# Patient Record
Sex: MALE | Race: WHITE | NOT HISPANIC OR LATINO | Employment: FULL TIME | URBAN - NONMETROPOLITAN AREA
[De-identification: names, ages, dates, MRNs, and addresses within clinical notes are randomized per-mention and may not be internally consistent; named-entity substitution may affect disease eponyms.]

---

## 2021-09-15 RX ORDER — OMEPRAZOLE 40 MG/1
40 CAPSULE, DELAYED RELEASE ORAL DAILY
COMMUNITY

## 2021-09-15 RX ORDER — CETIRIZINE HYDROCHLORIDE 10 MG/1
10 TABLET ORAL DAILY
COMMUNITY

## 2021-09-15 RX ORDER — ATORVASTATIN CALCIUM 10 MG/1
20 TABLET, FILM COATED ORAL DAILY
COMMUNITY

## 2021-09-15 RX ORDER — LISINOPRIL 10 MG/1
10 TABLET ORAL DAILY
COMMUNITY

## 2021-09-15 NOTE — PRE-PROCEDURE INSTRUCTIONS
Pre-Surgery Instructions:   Medication Instructions    atorvastatin (LIPITOR) 10 mg tablet Instructed patient per Anesthesia Guidelines   cetirizine (ZyrTEC) 10 mg tablet Instructed patient per Anesthesia Guidelines   lisinopril (ZESTRIL) 10 mg tablet Instructed patient per Anesthesia Guidelines   omeprazole (PriLOSEC) 40 MG capsule Instructed patient per Anesthesia Guidelines       Pt verbalizes understanding of the following:  - NPO after MN  - Hold Lisinopril DOP, can take other meds with a sip of water  - Hold cigarette smoking x24hrs prior   - Bathing instructions, has chg  - Avoid OTC Vit/ Herbals/ Suppl x7 days   - Avoid NSAIDs x3 days

## 2021-09-18 ENCOUNTER — APPOINTMENT (OUTPATIENT)
Dept: LAB | Facility: HOSPITAL | Age: 56
End: 2021-09-18
Attending: STUDENT IN AN ORGANIZED HEALTH CARE EDUCATION/TRAINING PROGRAM
Payer: COMMERCIAL

## 2021-09-18 DIAGNOSIS — Z01.818 PRE-OP TESTING: ICD-10-CM

## 2021-09-18 LAB
ANION GAP SERPL CALCULATED.3IONS-SCNC: 7 MMOL/L (ref 4–13)
BUN SERPL-MCNC: 14 MG/DL (ref 5–25)
CALCIUM SERPL-MCNC: 9 MG/DL (ref 8.3–10.1)
CHLORIDE SERPL-SCNC: 102 MMOL/L (ref 100–108)
CO2 SERPL-SCNC: 28 MMOL/L (ref 21–32)
CREAT SERPL-MCNC: 0.9 MG/DL (ref 0.6–1.3)
ERYTHROCYTE [DISTWIDTH] IN BLOOD BY AUTOMATED COUNT: 12.6 % (ref 11.6–15.1)
GFR SERPL CREATININE-BSD FRML MDRD: 94 ML/MIN/1.73SQ M
GLUCOSE SERPL-MCNC: 103 MG/DL (ref 65–140)
HCT VFR BLD AUTO: 47.3 % (ref 36.5–49.3)
HGB BLD-MCNC: 16.2 G/DL (ref 12–17)
MCH RBC QN AUTO: 31.6 PG (ref 26.8–34.3)
MCHC RBC AUTO-ENTMCNC: 34.2 G/DL (ref 31.4–37.4)
MCV RBC AUTO: 92 FL (ref 82–98)
PLATELET # BLD AUTO: 344 THOUSANDS/UL (ref 149–390)
PMV BLD AUTO: 10.1 FL (ref 8.9–12.7)
POTASSIUM SERPL-SCNC: 4.5 MMOL/L (ref 3.5–5.3)
RBC # BLD AUTO: 5.13 MILLION/UL (ref 3.88–5.62)
SODIUM SERPL-SCNC: 137 MMOL/L (ref 136–145)
WBC # BLD AUTO: 9.27 THOUSAND/UL (ref 4.31–10.16)

## 2021-09-18 PROCEDURE — 36415 COLL VENOUS BLD VENIPUNCTURE: CPT

## 2021-09-18 PROCEDURE — 80048 BASIC METABOLIC PNL TOTAL CA: CPT

## 2021-09-18 PROCEDURE — 93005 ELECTROCARDIOGRAM TRACING: CPT

## 2021-09-18 PROCEDURE — 85027 COMPLETE CBC AUTOMATED: CPT

## 2021-09-19 LAB
ATRIAL RATE: 83 BPM
P AXIS: 82 DEGREES
PR INTERVAL: 138 MS
QRS AXIS: 44 DEGREES
QRSD INTERVAL: 86 MS
QT INTERVAL: 366 MS
QTC INTERVAL: 430 MS
T WAVE AXIS: 48 DEGREES
VENTRICULAR RATE: 83 BPM

## 2021-09-19 PROCEDURE — 93010 ELECTROCARDIOGRAM REPORT: CPT | Performed by: INTERNAL MEDICINE

## 2021-09-23 ENCOUNTER — ANESTHESIA EVENT (OUTPATIENT)
Dept: PERIOP | Facility: HOSPITAL | Age: 56
End: 2021-09-23
Payer: COMMERCIAL

## 2021-09-23 NOTE — H&P
[facility] [place]     Date: 21        H&P     Referring Provider: BLAS Matute        CC: lump     HPI:  This is a 47-year-old male who presents due to a lump located in the right groin crease  This lump has been present for approximately 1 month  Initially, it was smaller in size  The lump becomes larger when he is on his feet for long time  At times there is a burning pain present  The lump usually goes away with laying down  He denies any nausea or vomiting  He does not have any diarrhea or constipation    He does have some difficulty emptying his bladder since the hernia has been present          History:       Past Medical History:   Diagnosis Date    Chronic lower back pain      Depression with anxiety 2015    Dyslipidemia, goal LDL below 100 2015    Dyslipidemia, goal LDL below 130 2016    MINISTERIO (generalized anxiety disorder) 2015    Gastroesophageal reflux disease without esophagitis 3/2/2018    Tobacco use disorder 2016               Past Surgical History:   Procedure Laterality Date    CARPAL TUNNEL SURGERY Right      COLONOSCOPY        DENTAL SURGERY PROCEDURE NEC        PARTIAL REMOVAL OF COLON         as child due to constipation    WA ARTHROSCOPY SHOULDER ROTATOR CUFF REPAIR Right                 Family History   Problem Relation Age of Onset    Cancer Father            age 59 lung CA    Endocrine Disorder Mother           high cholesterol    Thyroid Disorder Mother      No Past Hx Brother      No Known Problems Grandmother (Maternal)      No Known Problems Grandfather (Maternal)      No Known Problems Grandmother (Paternal)      No Known Problems Grandfather (Paternal)           Social History            Tobacco Use    Smoking status: Current Every Day Smoker       Packs/day: 0 50       Years: 30 00       Pack years: 15 00       Types: Cigarettes    Smokeless tobacco: Never Used   Substance Use Topics    Alcohol use: Yes       Comment: occasionally    Drug use: No              Review of patient's allergies indicates: Allergen Reactions    Seasonal  [Pollen]                  Current Outpatient Medications   Medication Sig Dispense Refill    Atorvastatin Calcium 20 MG Oral Tablet (Lipitor) Take 1 Tab by mouth daily  90 Tab 3    Cetirizine HCl 10 MG Oral Tablet (ZyrTEC Allergy) Take  by mouth         Cyclobenzaprine HCl 10 MG Oral Tablet (Flexeril) Take 1 Tab by mouth at bedtime as needed for Muscle spasms  20 Tab 0    Lisinopril 20 MG Oral Tablet (Prinivil) Take 1 Tab by mouth daily  90 Tab 3    Omeprazole 40 MG Oral Capsule Delayed Release (PriLOSEC) TAKE 1 CAPSULE BY MOUTH ONCE DAILY AS DIRECTED  90 Cap 3    Mometasone Furoate (NASONEX) 50 MCG/ACT nasal spray Administer 2 Sprays into each nostril daily  1 Inhaler 5         ROS:  Constitutional:  Denies weakness and fatigue  ENT:  Denies congestion  Resp:  Denies shortness of breath  Cardiac:  Denies chest pain  GI:  Denies abdominal pain, nausea, vomiting  Musculoskeletal:  Denies muscle ache  Neuro:  Denies headache  Heme:  Denies history of bleeding or blood clots  Endo:  Denies fever  Skin:  Denies skin changes        PE:  Vital Signs:BP (!) 155/102   Pulse 85   Temp 98 2 °F (36 8 °C) (Oral)   Resp 20   Ht 5' 11" (1 803 m)   Wt 90 7 kg (200 lb)   SpO2 98%   BMI 27 89 kg/m²     Gen:  No acute distress, appears comfortable  HEENT:  Normocephalic, atraumatic  Lungs:  Clear auscultation bilateral  Heart:  Regular rate and rhythm, no murmur  Abdomen:  Soft, nontender, nondistended  Groin:  Positive for a reducible right inguinal hernia, no evidence of left inguinal hernia  Ext:  No edema  Skin:  Warm, dry, intact  Neuro:  Awake, alert oriented x3     Labs:  None available     Radiology:  None available     Assessment:  52-year-old male with a right inguinal hernia      Plan:   Recommend robotic assisted right inguinal hernia repair mesh    The procedure was discussed in detail with the patient  Understood the risks including spermatic cord injury, bleeding, infection  All questions were answered to the patient's satisfaction    The patient signed consent

## 2021-09-24 ENCOUNTER — HOSPITAL ENCOUNTER (OUTPATIENT)
Facility: HOSPITAL | Age: 56
Setting detail: OUTPATIENT SURGERY
Discharge: HOME | End: 2021-09-24
Attending: STUDENT IN AN ORGANIZED HEALTH CARE EDUCATION/TRAINING PROGRAM | Admitting: STUDENT IN AN ORGANIZED HEALTH CARE EDUCATION/TRAINING PROGRAM
Payer: COMMERCIAL

## 2021-09-24 ENCOUNTER — ANESTHESIA (OUTPATIENT)
Dept: PERIOP | Facility: HOSPITAL | Age: 56
End: 2021-09-24
Payer: COMMERCIAL

## 2021-09-24 VITALS
RESPIRATION RATE: 20 BRPM | SYSTOLIC BLOOD PRESSURE: 159 MMHG | BODY MASS INDEX: 28 KG/M2 | OXYGEN SATURATION: 96 % | TEMPERATURE: 97.6 F | DIASTOLIC BLOOD PRESSURE: 89 MMHG | HEART RATE: 76 BPM | HEIGHT: 71 IN | WEIGHT: 200 LBS

## 2021-09-24 DIAGNOSIS — K40.90 NON-RECURRENT UNILATERAL INGUINAL HERNIA WITHOUT OBSTRUCTION OR GANGRENE: Primary | ICD-10-CM

## 2021-09-24 PROCEDURE — C1781 MESH (IMPLANTABLE): HCPCS | Performed by: STUDENT IN AN ORGANIZED HEALTH CARE EDUCATION/TRAINING PROGRAM

## 2021-09-24 DEVICE — 3DMAX™ MID ANATOMICAL MESH, LARGE, RIGHT, 4” X 6”, 10 X 16 CM
Type: IMPLANTABLE DEVICE | Site: INGUINAL | Status: FUNCTIONAL
Brand: 3DMAX™ MID ANATOMICAL MESH

## 2021-09-24 RX ORDER — LIDOCAINE HYDROCHLORIDE 10 MG/ML
INJECTION, SOLUTION EPIDURAL; INFILTRATION; INTRACAUDAL; PERINEURAL AS NEEDED
Status: DISCONTINUED | OUTPATIENT
Start: 2021-09-24 | End: 2021-09-24

## 2021-09-24 RX ORDER — ALBUTEROL SULFATE 2.5 MG/3ML
SOLUTION RESPIRATORY (INHALATION) AS NEEDED
Status: DISCONTINUED | OUTPATIENT
Start: 2021-09-24 | End: 2021-09-24

## 2021-09-24 RX ORDER — CEFAZOLIN SODIUM 2 G/50ML
2000 SOLUTION INTRAVENOUS ONCE
Status: COMPLETED | OUTPATIENT
Start: 2021-09-24 | End: 2021-09-24

## 2021-09-24 RX ORDER — ROCURONIUM BROMIDE 10 MG/ML
INJECTION, SOLUTION INTRAVENOUS AS NEEDED
Status: DISCONTINUED | OUTPATIENT
Start: 2021-09-24 | End: 2021-09-24

## 2021-09-24 RX ORDER — IBUPROFEN 600 MG/1
600 TABLET ORAL EVERY 8 HOURS PRN
Qty: 30 TABLET | Refills: 0 | Status: SHIPPED | OUTPATIENT
Start: 2021-09-24

## 2021-09-24 RX ORDER — SODIUM CHLORIDE, SODIUM LACTATE, POTASSIUM CHLORIDE, CALCIUM CHLORIDE 600; 310; 30; 20 MG/100ML; MG/100ML; MG/100ML; MG/100ML
50 INJECTION, SOLUTION INTRAVENOUS CONTINUOUS
Status: DISCONTINUED | OUTPATIENT
Start: 2021-09-24 | End: 2021-09-24 | Stop reason: HOSPADM

## 2021-09-24 RX ORDER — ONDANSETRON 2 MG/ML
INJECTION INTRAMUSCULAR; INTRAVENOUS AS NEEDED
Status: DISCONTINUED | OUTPATIENT
Start: 2021-09-24 | End: 2021-09-24

## 2021-09-24 RX ORDER — HYDROMORPHONE HCL/PF 1 MG/ML
0.2 SYRINGE (ML) INJECTION
Status: DISCONTINUED | OUTPATIENT
Start: 2021-09-24 | End: 2021-09-24 | Stop reason: HOSPADM

## 2021-09-24 RX ORDER — FENTANYL CITRATE/PF 50 MCG/ML
50 SYRINGE (ML) INJECTION
Status: DISCONTINUED | OUTPATIENT
Start: 2021-09-24 | End: 2021-09-24 | Stop reason: HOSPADM

## 2021-09-24 RX ORDER — ONDANSETRON 2 MG/ML
4 INJECTION INTRAMUSCULAR; INTRAVENOUS ONCE AS NEEDED
Status: DISCONTINUED | OUTPATIENT
Start: 2021-09-24 | End: 2021-09-24 | Stop reason: HOSPADM

## 2021-09-24 RX ORDER — BUPIVACAINE HYDROCHLORIDE AND EPINEPHRINE 2.5; 5 MG/ML; UG/ML
INJECTION, SOLUTION EPIDURAL; INFILTRATION; INTRACAUDAL; PERINEURAL AS NEEDED
Status: DISCONTINUED | OUTPATIENT
Start: 2021-09-24 | End: 2021-09-24 | Stop reason: HOSPADM

## 2021-09-24 RX ORDER — ALBUTEROL SULFATE 2.5 MG/3ML
2.5 SOLUTION RESPIRATORY (INHALATION) ONCE AS NEEDED
Status: DISCONTINUED | OUTPATIENT
Start: 2021-09-24 | End: 2021-09-24 | Stop reason: HOSPADM

## 2021-09-24 RX ORDER — MIDAZOLAM HYDROCHLORIDE 2 MG/2ML
INJECTION, SOLUTION INTRAMUSCULAR; INTRAVENOUS AS NEEDED
Status: DISCONTINUED | OUTPATIENT
Start: 2021-09-24 | End: 2021-09-24

## 2021-09-24 RX ORDER — DEXAMETHASONE SODIUM PHOSPHATE 10 MG/ML
INJECTION, SOLUTION INTRAMUSCULAR; INTRAVENOUS AS NEEDED
Status: DISCONTINUED | OUTPATIENT
Start: 2021-09-24 | End: 2021-09-24

## 2021-09-24 RX ORDER — SODIUM CHLORIDE, SODIUM LACTATE, POTASSIUM CHLORIDE, CALCIUM CHLORIDE 600; 310; 30; 20 MG/100ML; MG/100ML; MG/100ML; MG/100ML
INJECTION, SOLUTION INTRAVENOUS CONTINUOUS PRN
Status: DISCONTINUED | OUTPATIENT
Start: 2021-09-24 | End: 2021-09-24

## 2021-09-24 RX ORDER — PROPOFOL 10 MG/ML
INJECTION, EMULSION INTRAVENOUS AS NEEDED
Status: DISCONTINUED | OUTPATIENT
Start: 2021-09-24 | End: 2021-09-24

## 2021-09-24 RX ORDER — KETOROLAC TROMETHAMINE 30 MG/ML
INJECTION, SOLUTION INTRAMUSCULAR; INTRAVENOUS AS NEEDED
Status: DISCONTINUED | OUTPATIENT
Start: 2021-09-24 | End: 2021-09-24

## 2021-09-24 RX ORDER — FENTANYL CITRATE 50 UG/ML
INJECTION, SOLUTION INTRAMUSCULAR; INTRAVENOUS AS NEEDED
Status: DISCONTINUED | OUTPATIENT
Start: 2021-09-24 | End: 2021-09-24

## 2021-09-24 RX ADMIN — FENTANYL CITRATE 50 MCG: 50 INJECTION, SOLUTION INTRAMUSCULAR; INTRAVENOUS at 09:06

## 2021-09-24 RX ADMIN — CEFAZOLIN SODIUM 2000 MG: 2 SOLUTION INTRAVENOUS at 07:26

## 2021-09-24 RX ADMIN — FENTANYL CITRATE 50 MCG: 0.05 INJECTION, SOLUTION INTRAMUSCULAR; INTRAVENOUS at 10:12

## 2021-09-24 RX ADMIN — KETOROLAC TROMETHAMINE 30 MG: 30 INJECTION, SOLUTION INTRAMUSCULAR at 09:07

## 2021-09-24 RX ADMIN — FENTANYL CITRATE 50 MCG: 0.05 INJECTION, SOLUTION INTRAMUSCULAR; INTRAVENOUS at 10:27

## 2021-09-24 RX ADMIN — DEXAMETHASONE SODIUM PHOSPHATE 4 MG: 10 INJECTION, SOLUTION INTRAMUSCULAR; INTRAVENOUS at 07:38

## 2021-09-24 RX ADMIN — ONDANSETRON 4 MG: 2 INJECTION INTRAMUSCULAR; INTRAVENOUS at 08:57

## 2021-09-24 RX ADMIN — FENTANYL CITRATE 50 MCG: 50 INJECTION, SOLUTION INTRAMUSCULAR; INTRAVENOUS at 09:23

## 2021-09-24 RX ADMIN — ROCURONIUM BROMIDE 20 MG: 10 INJECTION, SOLUTION INTRAVENOUS at 08:19

## 2021-09-24 RX ADMIN — FENTANYL CITRATE 50 MCG: 50 INJECTION, SOLUTION INTRAMUSCULAR; INTRAVENOUS at 08:19

## 2021-09-24 RX ADMIN — SUGAMMADEX 200 MG: 100 INJECTION, SOLUTION INTRAVENOUS at 08:57

## 2021-09-24 RX ADMIN — SODIUM CHLORIDE, SODIUM LACTATE, POTASSIUM CHLORIDE, AND CALCIUM CHLORIDE: .6; .31; .03; .02 INJECTION, SOLUTION INTRAVENOUS at 06:30

## 2021-09-24 RX ADMIN — PROPOFOL 200 MG: 10 INJECTION, EMULSION INTRAVENOUS at 07:33

## 2021-09-24 RX ADMIN — ROCURONIUM BROMIDE 30 MG: 10 INJECTION, SOLUTION INTRAVENOUS at 08:04

## 2021-09-24 RX ADMIN — MIDAZOLAM HYDROCHLORIDE 2 MG: 1 INJECTION, SOLUTION INTRAMUSCULAR; INTRAVENOUS at 07:30

## 2021-09-24 RX ADMIN — ALBUTEROL SULFATE 1.8 MG: 2.5 SOLUTION RESPIRATORY (INHALATION) at 07:42

## 2021-09-24 RX ADMIN — FENTANYL CITRATE 50 MCG: 50 INJECTION, SOLUTION INTRAMUSCULAR; INTRAVENOUS at 07:33

## 2021-09-24 RX ADMIN — ALBUTEROL SULFATE 1.8 MG: 2.5 SOLUTION RESPIRATORY (INHALATION) at 09:03

## 2021-09-24 RX ADMIN — FENTANYL CITRATE 50 MCG: 50 INJECTION, SOLUTION INTRAMUSCULAR; INTRAVENOUS at 07:31

## 2021-09-24 RX ADMIN — ROCURONIUM BROMIDE 50 MG: 10 INJECTION, SOLUTION INTRAVENOUS at 07:33

## 2021-09-24 RX ADMIN — FENTANYL CITRATE 50 MCG: 50 INJECTION, SOLUTION INTRAMUSCULAR; INTRAVENOUS at 08:11

## 2021-09-24 RX ADMIN — LIDOCAINE HYDROCHLORIDE 50 MG: 10 INJECTION, SOLUTION EPIDURAL; INFILTRATION; INTRACAUDAL; PERINEURAL at 07:33

## 2021-09-24 RX ADMIN — SODIUM CHLORIDE, SODIUM LACTATE, POTASSIUM CHLORIDE, AND CALCIUM CHLORIDE: .6; .31; .03; .02 INJECTION, SOLUTION INTRAVENOUS at 07:35

## 2021-09-24 NOTE — DISCHARGE INSTRUCTIONS
Robotic Assisted inguinal hernia repair    DISCHARGE INSTRUCTIONS:   Call Dr Zac Fontana office with any questions or concerns at [phone]    Call your local emergency number (911 in the [address]) if:   · You feel lightheaded, short of breath, and have chest pain  · You cough up a large amount of blood  Seek care immediately if:   · Your arm or leg feels warm, tender, and painful  It may look swollen and red  · You have blood clots or fluid around your surgery site  You have pain in your groin or surgery site that does not get better after you take pain medicine  You have trouble urinating  · You suddenly have numbness in your groin area  Call your doctor or surgeon if:   · You are bleeding more than expected from your surgery site  · You have a fever  · Your surgery site is swollen, red, or has pus coming from it  · You have questions or concerns about your condition or care  Medicines:   · Prescription pain medicine prescription strength ibuprofen has been provided  You may alternate this medication with Tylenol  Take Tylenol as directed on the bottle  · Take your medicine as directed  Contact your healthcare provider if you think your medicine is not helping or if you have side effects  Tell him or her if you are allergic to any medicine  Keep a list of the medicines, vitamins, and herbs you take  Include the amounts, and when and why you take them  Bring the list or the pill bottles to follow-up visits  Carry your medicine list with you in case of an emergency  Activity:  Avoid heavy lifting, pushing, or pulling more than 10 lb for 2 weeks  You may walk as much as tolerated  He may also go up stairs  Do not perform any strenuous exercise for 2 weeks  Prevent or manage constipation: It is normal to feel constipated after surgery  If you do not move your bowels for 2 days you may take Colace 100 mg twice a day    If this does not result in a bowel movement, you may take MiraLax as directed on the bottle     Incisions: You have glue covering her incisions  You may shower and pat the incisions dry  Do not scrub over the incisions  Do not soak in a tub or hot tub for 2 weeks  He may apply ice to the incisions as needed for swelling or comfort  He may also apply ice to the groin area  Bruising and swelling of the groin area is expected  Follow up with your doctor or surgeon as directed:  Write down your questions so you remember to ask them during your visits

## 2021-09-24 NOTE — OP NOTE
OPERATIVE REPORT  PATIENT NAME: Rober Kline    :  1965  MRN: [id number]  Pt Location: [facility] OR ROOM 01    SURGERY DATE: 2021    Surgeon(s) and Role:     * Norm Garcia, DO - Primary     * Evelyn Castillo PA-C    Preop Diagnosis:  Unilateral inguinal hernia, without obstruction or gangrene, not specified as recurrent [K40 90]    Post-Op Diagnosis Codes:     * Unilateral inguinal hernia, without obstruction or gangrene, not specified as recurrent [K40 90]    Procedure(s) (LRB):  ROBOTIC ASSISTED LAPAROSCOPIC INGUINAL HERNIA REPAIR WITH MESH (Right)  OPEN INGUINAL HERNIA REPAIR (Right)    Specimen(s):  * No specimens in log *    Estimated Blood Loss:   Minimal    Drains:  * No LDAs found *    Anesthesia Type:   General    Operative Indications:  Unilateral inguinal hernia, without obstruction or gangrene, not specified as recurrent [K40 90]  Right side    Operative Findings:  Right indirect inguinal hernia containing small bowel    Complications:   None    Procedure and Technique:  The patient is brought to the operating room  A time-out was held the patient identified and procedure verified  Appropriate antibiotic prophylaxis and DVT prophylaxis was used  General anesthesia was administered  The area of the abdomen is prepped and draped usual sterile fashion using chlorhexidine solution  Local anesthesia and 1% lidocaine with epinephrine was infiltrated in the supraumbilical area  A small incision was made using 11 blade scalpel  The skin was grasped and elevated using towel clamps  A Veress needle was placed and confirmed to be intraperitoneal using a saline drop test   The abdomen is insufflated to15 mmHg intraperitoneal pressure  A 12 mm trocar was placed  The abdomen was inspected and found to be free of adhesions  An 8 mm robotic trocar was placed in the left lateral abdomen under direct visualization and after the infiltration of local anesthesia    An additional 8 mm robotic trocar was placed in the right lateral abdomen in the same fashion  The patient was slightly placed in Trendelenburg position  Robotic cart was advanced into the operative field and appropriately docked  I then took control at the console  On evaluation of the inguinal area a Right inguinal hernia was noted  An incision was made in the peritoneum superior to the internal inguinal ring  The preperitoneal space was then dissected inferiorly until Tyrone's ligament was exposed medially and the transversalis fascia was exposed laterally  The hernia sac was then carefully reduced using blunt and sharp dissection  Care was taken to avoid the spermatic cord vessels  Once the sac was completely reduced dissection was further carried inferiorly  A 3D zeke MID large right mesh was selected  This was introduced into the abdominal cavity  The mesh was then placed into the created pocket  The mesh was found to cover the inguinal hernia defect without difficulty  The mesh was secured medially to Tyrone's ligament and laterally to the transversalis fascia  The repair appeared to be adequate  The peritoneal incision was closed using a running suture of 2 0 90 day absorbable V lock     The robot was undocked  All trocars were removed and the abdomen was desufflated  Fascia of the 12 mm trocar site was grasped using a Kocher clamp  This was elevated  The fascia was closed using a figure-of-eight suture of 0 vicryl  Skin incisions were closed using 4-0 Vicryl in a subcuticular layer  All incisions were covered with skin glue  The patient tolerated the procedure well was transferred to recovery stable condition  At the end the procedure all needle instrument sponge counts were correct x2        I was present for the entire procedure and A physician assistant was required during the procedure for retraction tissue handling,dissection and suturing    Patient Disposition:  PACU     SIGNATURE: Karol Muro Brandy Stevens DO  DATE: September 24, 2021  TIME: 9:19 AM

## 2021-09-24 NOTE — ANESTHESIA POSTPROCEDURE EVALUATION
Post-Op Assessment Note    CV Status:  Stable  Pain Score: 0    Pain management: adequate     Mental Status:  Sleepy   Hydration Status:  Euvolemic   PONV Controlled:  Controlled   Airway Patency:  Patent      Post Op Vitals Reviewed: Yes      Staff: CRNA         No complications documented      BP (P) 148/86 (09/24/21 0925)    Temp (!) (P) 97 4 °F (36 3 °C) (09/24/21 0925)    Pulse (P) 80 (09/24/21 0925)   Resp (P) 18 (09/24/21 0925)    SpO2 (P) 94 % (09/24/21 0925)

## 2021-09-24 NOTE — ANESTHESIA PREPROCEDURE EVALUATION
Procedure:  ROBOTIC ASSISTED LAPAROSCOPIC INGUINAL HERNIA REPAIR WITH MESH (Right Chest)  OPEN INGUINAL HERNIA REPAIR (Right Groin)    Relevant Problems   CARDIO   (+) Hyperlipidemia   (+) Hypertension      GI/HEPATIC   (+) GERD (gastroesophageal reflux disease)      PULMONARY   (+) Smoking        Physical Exam    Airway    Mallampati score: II  TM Distance: >3 FB  Neck ROM: full     Dental   No notable dental hx     Cardiovascular      Pulmonary      Other Findings        Anesthesia Plan  ASA Score- 2     Anesthesia Type- general with ASA Monitors  Additional Monitors:   Airway Plan: ETT  Plan Factors-Exercise tolerance (METS): >4 METS  Chart reviewed  EKG reviewed  Existing labs reviewed  Patient summary reviewed  Patient is a current smoker  Patient instructed to abstain from smoking on day of procedure  Patient smoked on day of surgery  Induction- intravenous  Postoperative Plan- Plan for postoperative opioid use  Planned trial extubation    Informed Consent- Anesthetic plan and risks discussed with patient  I personally reviewed this patient with the CRNA  Discussed and agreed on the Anesthesia Plan with the CRNA  Paras Quinonez

## (undated) DEVICE — MEGASUTURECUT ND: Brand: ENDOWRIST;DAVINCI SI

## (undated) DEVICE — DRAPE SHEET THREE QUARTER

## (undated) DEVICE — GLOVE INDICATOR PI UNDERGLOVE SZ 6.5 BLUE

## (undated) DEVICE — ENDOPATH PNEUMONEEDLE INSUFFLATION NEEDLES WITH LUER LOCK CONNECTORS 120MM: Brand: ENDOPATH

## (undated) DEVICE — TROCAR: Brand: KII FIOS FIRST ENTRY

## (undated) DEVICE — SUT VLOC 90 2-0 GS-22 12 IN VLOCM2115

## (undated) DEVICE — SUT VICRYL 0 UR-6 27 IN J603H

## (undated) DEVICE — SUT VICRYL 4-0 PS-2 18 IN J496G

## (undated) DEVICE — KIT ROBOT DRAPE DISP ACCESSORY KIT 3-ARM

## (undated) DEVICE — 40595 XL TRENDELENBURG POSITIONING KIT: Brand: 40595 XL TRENDELENBURG POSITIONING KIT

## (undated) DEVICE — FENESTRATED BIPOLAR FORCEPS: Brand: ENDOWRIST;DAVINCI SI

## (undated) DEVICE — TUBING SMOKE EVAC W/FILTRATION DEVICE PLUMEPORT ACTIV

## (undated) DEVICE — BIPOLAR CORD DISP

## (undated) DEVICE — TUBING INSUFFLATION SET ISO CONNECTOR

## (undated) DEVICE — CHLORAPREP HI-LITE 26ML ORANGE

## (undated) DEVICE — LUBRICANT INST ELECTROLUBE ANTISTK WO PAD

## (undated) DEVICE — TIP COVER ACCESSORY

## (undated) DEVICE — ASTOUND FABRIC REINFORCED SURGICAL GOWN: Brand: CONVERTORS

## (undated) DEVICE — ALLENTOWN LAP CHOLE APP PACK: Brand: CARDINAL HEALTH

## (undated) DEVICE — ROBOT INST CANNULA 8MM OBTURATOR BLUNT DISP

## (undated) DEVICE — PENCIL ELECTROSURG E-Z CLEAN -0035H

## (undated) DEVICE — DRAPE EQUIPMENT RF WAND

## (undated) DEVICE — PAD GROUNDING ADULT

## (undated) DEVICE — SUT VICRYL 2-0 CT-2 27 IN J269H

## (undated) DEVICE — VIAL DECANTER

## (undated) DEVICE — NEEDLE 25G X 1 1/2

## (undated) DEVICE — SYRINGE 10ML LL

## (undated) DEVICE — SCD SEQUENTIAL COMPRESSION COMFORT SLEEVE MEDIUM KNEE LENGTH: Brand: KENDALL SCD

## (undated) DEVICE — ADHESIVE SKIN HIGH VISCOSITY EXOFIN 1ML

## (undated) DEVICE — GLOVE SRG BIOGEL 6

## (undated) DEVICE — SURGICAL CLIPPER BLADE GENERAL USE

## (undated) DEVICE — INTENDED FOR TISSUE SEPARATION, AND OTHER PROCEDURES THAT REQUIRE A SHARP SURGICAL BLADE TO PUNCTURE OR CUT.: Brand: BARD-PARKER SAFETY BLADES SIZE 11, STERILE

## (undated) DEVICE — MONOPOLAR CURVED SCISSORS: Brand: ENDOWRIST;DAVINCI SI